# Patient Record
Sex: FEMALE | URBAN - METROPOLITAN AREA
[De-identification: names, ages, dates, MRNs, and addresses within clinical notes are randomized per-mention and may not be internally consistent; named-entity substitution may affect disease eponyms.]

---

## 2021-05-29 ENCOUNTER — RECORDS - HEALTHEAST (OUTPATIENT)
Dept: ADMINISTRATIVE | Facility: CLINIC | Age: 76
End: 2021-05-29

## 2022-06-01 ENCOUNTER — TRANSFERRED RECORDS (OUTPATIENT)
Dept: HEALTH INFORMATION MANAGEMENT | Facility: CLINIC | Age: 77
End: 2022-06-01
Payer: COMMERCIAL

## 2022-06-02 ENCOUNTER — MEDICAL CORRESPONDENCE (OUTPATIENT)
Dept: HEALTH INFORMATION MANAGEMENT | Facility: CLINIC | Age: 77
End: 2022-06-02
Payer: COMMERCIAL

## 2022-06-02 ENCOUNTER — TRANSFERRED RECORDS (OUTPATIENT)
Dept: HEALTH INFORMATION MANAGEMENT | Facility: CLINIC | Age: 77
End: 2022-06-02

## 2022-06-02 LAB
ALT SERPL-CCNC: 19 IU/L (ref 0–32)
AST SERPL-CCNC: 24 IU/L (ref 0–40)
CHOLESTEROL (EXTERNAL): 198 MG/DL (ref 100–199)
CREATININE (EXTERNAL): 0.79 MG/DL (ref 0.57–1)
GFR ESTIMATED (EXTERNAL): 77 ML/MIN/1.73
GLUCOSE (EXTERNAL): 122 MG/DL (ref 65–99)
HBA1C MFR BLD: 6.1 % (ref 4.8–5.6)
HDLC SERPL-MCNC: 67 MG/DL
LDL CHOLESTEROL (EXTERNAL): 110 MG/DL (ref 0–99)
POTASSIUM (EXTERNAL): 4.5 MMOL/L (ref 3.5–5.2)
TRIGLYCERIDES (EXTERNAL): 117 MG/DL (ref 0–149)
TSH SERPL-ACNC: 0.92 UIU/ML (ref 0.45–4.5)

## 2022-06-13 ENCOUNTER — TELEPHONE (OUTPATIENT)
Dept: SURGERY | Facility: CLINIC | Age: 77
End: 2022-06-13
Payer: COMMERCIAL

## 2022-06-13 NOTE — TELEPHONE ENCOUNTER
LEÓNM with call back number. Patient wanting to scheduling new visit for prolapsed rectum. Was a cervical cancer patient at the  in 1986. Schedule [facility] new next available with any provider. Lou Gonsalez likely has soonest openings, in September. Can add to wait list if pt prefers. No Mychart.

## 2022-06-15 ENCOUNTER — TRANSCRIBE ORDERS (OUTPATIENT)
Dept: OTHER | Age: 77
End: 2022-06-15
Payer: COMMERCIAL

## 2022-06-15 DIAGNOSIS — R15.9 FULL INCONTINENCE OF FECES: Primary | ICD-10-CM

## 2022-06-15 NOTE — TELEPHONE ENCOUNTER
Diagnosis, Referred by & from: Prolapsed Rectum   Appt date: 9/14/2022   NOTES STATUS DETAILS   OFFICE NOTE from referring provider Received [facility]:  6/1/22 - PCC OV with Dr. Freire   OFFICE NOTE from other specialist [facility] [place]:  8/18/22, 8/4/22 - PCC OV with Rebecca Harris NP    [facility]:  8/5/17 - UC OV with Dr. Gibbons  7/23/17 - UC OV with RAJWINDER Abrams   DISCHARGE SUMMARY from hospital N/A    DISCHARGE REPORT from the ER N/A    OPERATIVE REPORT N/A    MEDICATION LIST Received    LABS N/A    DIAGNOSTIC PROCEDURES N/A    IMAGING (DISC & REPORT) N/A

## 2022-09-14 ENCOUNTER — PRE VISIT (OUTPATIENT)
Dept: SURGERY | Facility: CLINIC | Age: 77
End: 2022-09-14